# Patient Record
Sex: MALE | Race: OTHER | HISPANIC OR LATINO | URBAN - METROPOLITAN AREA
[De-identification: names, ages, dates, MRNs, and addresses within clinical notes are randomized per-mention and may not be internally consistent; named-entity substitution may affect disease eponyms.]

---

## 2020-06-22 ENCOUNTER — OFFICE VISIT (OUTPATIENT)
Dept: URBAN - METROPOLITAN AREA CLINIC 78 | Facility: CLINIC | Age: 25
End: 2020-06-22

## 2020-06-25 ENCOUNTER — OFFICE VISIT (OUTPATIENT)
Dept: URBAN - METROPOLITAN AREA CLINIC 78 | Facility: CLINIC | Age: 25
End: 2020-06-25
Payer: SELF-PAY

## 2020-06-25 DIAGNOSIS — R19.8 TENESMUS (RECTAL): ICD-10-CM

## 2020-06-25 DIAGNOSIS — K59.01 CONSTIPATION: ICD-10-CM

## 2020-06-25 DIAGNOSIS — K62.89 ANAL PAIN: ICD-10-CM

## 2020-06-25 PROCEDURE — G8427 DOCREV CUR MEDS BY ELIG CLIN: HCPCS | Performed by: INTERNAL MEDICINE

## 2020-06-25 PROCEDURE — 46611 ANOSCOPY: CPT | Performed by: INTERNAL MEDICINE

## 2020-06-25 PROCEDURE — G9902 PT SCRN TBCO AND ID AS USER: HCPCS | Performed by: INTERNAL MEDICINE

## 2020-06-25 PROCEDURE — 99204 OFFICE O/P NEW MOD 45 MIN: CPT | Performed by: INTERNAL MEDICINE

## 2020-06-25 PROCEDURE — G9903 PT SCRN TBCO ID AS NON USER: HCPCS | Performed by: INTERNAL MEDICINE

## 2020-06-25 PROCEDURE — G8420 CALC BMI NORM PARAMETERS: HCPCS | Performed by: INTERNAL MEDICINE

## 2020-06-25 PROCEDURE — 4004F PT TOBACCO SCREEN RCVD TLK: CPT | Performed by: INTERNAL MEDICINE

## 2020-06-25 PROCEDURE — G9906 PT RECV TBCO CESS INTERV: HCPCS | Performed by: INTERNAL MEDICINE

## 2020-06-25 RX ORDER — SODIUM, POTASSIUM,MAG SULFATES 17.5-3.13G
354 ML SOLUTION, RECONSTITUTED, ORAL ORAL
Qty: 1 | Refills: 0 | OUTPATIENT
Start: 2020-06-25

## 2020-06-25 NOTE — HPI-TODAY'S VISIT:
The patient states that he has been experiencing anal pain for the past 1 month. He suffers from chronic constipation. He was started on Miralax QD, and he has been able to have a soft formed BM daily for the most part. The bloating has improved sicne starting the above. He was given a prescription Proctozone+HC but stopped it as he could not tell that it was providing any relief. He has not seen blood in the stools but he has had tenesmus. He has not had heartburn, nausea, vomiting. He was diagnosed with H pylori gastritits on 9/10/19 back in [place]. He completed treatment but eradication was never completed.    He has never had a colonoscopy. He does not have FH of colon cancer, colon polyps or IBD.

## 2020-06-25 NOTE — PHYSICAL EXAM GASTROINTESTINAL
Abdomen , soft, nontender, nondistended , no guarding or rigidity , no masses palpable , normal bowel sounds , Liver and Spleen , no hepatomegaly present , no hepatosplenomegaly , liver nontender , spleen not palpable , Rectal , normal sphincter tone , no external hemorrhoids. Small grade 1-2 internal hemorrhoids noted on anoscopy, no rectal masses or bleeding present. No anal fissure seen. No pain on BONY.

## 2020-07-01 ENCOUNTER — OFFICE VISIT (OUTPATIENT)
Dept: URBAN - METROPOLITAN AREA SURGERY CENTER 15 | Facility: SURGERY CENTER | Age: 25
End: 2020-07-01
Payer: SELF-PAY

## 2020-07-01 DIAGNOSIS — K59.09 CHRONIC CONSTIPATION: ICD-10-CM

## 2020-07-01 DIAGNOSIS — K62.89 ANAL BURNING: ICD-10-CM

## 2020-07-01 PROCEDURE — G9937 DIG OR SURV COLSCO: HCPCS | Performed by: INTERNAL MEDICINE

## 2020-07-01 PROCEDURE — 45378 DIAGNOSTIC COLONOSCOPY: CPT | Performed by: INTERNAL MEDICINE

## 2020-07-01 PROCEDURE — G8907 PT DOC NO EVENTS ON DISCHARG: HCPCS | Performed by: INTERNAL MEDICINE

## 2020-07-01 RX ORDER — SODIUM, POTASSIUM,MAG SULFATES 17.5-3.13G
354 ML SOLUTION, RECONSTITUTED, ORAL ORAL
Qty: 1 | Refills: 0 | Status: ACTIVE | COMMUNITY
Start: 2020-06-25

## 2020-07-23 ENCOUNTER — OFFICE VISIT (OUTPATIENT)
Dept: URBAN - METROPOLITAN AREA CLINIC 78 | Facility: CLINIC | Age: 25
End: 2020-07-23
Payer: SELF-PAY

## 2020-07-23 ENCOUNTER — DASHBOARD ENCOUNTERS (OUTPATIENT)
Age: 25
End: 2020-07-23

## 2020-07-23 DIAGNOSIS — K59.01 CONSTIPATION: ICD-10-CM

## 2020-07-23 DIAGNOSIS — R19.8 TENESMUS (RECTAL): ICD-10-CM

## 2020-07-23 DIAGNOSIS — K64.8 INTERNAL HEMORRHOIDS: ICD-10-CM

## 2020-07-23 DIAGNOSIS — K62.89 ANAL PAIN: ICD-10-CM

## 2020-07-23 PROCEDURE — 99214 OFFICE O/P EST MOD 30 MIN: CPT | Performed by: INTERNAL MEDICINE

## 2020-07-23 PROCEDURE — G9903 PT SCRN TBCO ID AS NON USER: HCPCS | Performed by: INTERNAL MEDICINE

## 2020-07-23 PROCEDURE — G8420 CALC BMI NORM PARAMETERS: HCPCS | Performed by: INTERNAL MEDICINE

## 2020-07-23 PROCEDURE — G8427 DOCREV CUR MEDS BY ELIG CLIN: HCPCS | Performed by: INTERNAL MEDICINE

## 2020-07-23 RX ORDER — SODIUM, POTASSIUM,MAG SULFATES 17.5-3.13G
354 ML SOLUTION, RECONSTITUTED, ORAL ORAL
Qty: 1 | Refills: 0 | Status: ACTIVE | COMMUNITY
Start: 2020-06-25

## 2020-07-23 NOTE — HPI-TODAY'S VISIT:
The patient had initially seen me as he was experiencing anal pain for the previous 1 month. He suffers from chronic constipation. He was started on Miralax QD, and he has been able to have a soft formed BM daily for the most part. The bloating has improved since starting the above. He was given a prescription Proctozone+HC but stopped it as he could not tell that it was providing any relief. He has not seen blood in the stools. He has not had as much tenesmus. He has not had heartburn, nausea or vomiting. He was diagnosed with H pylori gastritits on 9/10/19 back in [place]. He completed treatment but eradication was never completed.    He does not have a FH of colon cancer, colon polyps or IBD.  Summary of prior workup: - Colonoscopy by me on 7/1/20 revealed a normal colon to the TI except for IH's. The anus was unremarkable. Quality of the prep was good.